# Patient Record
Sex: MALE
[De-identification: names, ages, dates, MRNs, and addresses within clinical notes are randomized per-mention and may not be internally consistent; named-entity substitution may affect disease eponyms.]

---

## 2020-08-26 ENCOUNTER — DASHBOARD ENCOUNTERS (OUTPATIENT)
Age: 70
End: 2020-08-26

## 2020-08-26 ENCOUNTER — OFFICE VISIT (OUTPATIENT)
Dept: URBAN - METROPOLITAN AREA CLINIC 19 | Facility: CLINIC | Age: 70
End: 2020-08-26
Payer: MEDICARE

## 2020-08-26 DIAGNOSIS — R53.83 OTHER FATIGUE: ICD-10-CM

## 2020-08-26 DIAGNOSIS — R10.32 ABDOMINAL PAIN, LLQ: ICD-10-CM

## 2020-08-26 DIAGNOSIS — R10.31 ABDOMINAL PAIN, RLQ: ICD-10-CM

## 2020-08-26 PROCEDURE — 99204 OFFICE O/P NEW MOD 45 MIN: CPT | Performed by: INTERNAL MEDICINE

## 2020-08-26 PROCEDURE — 3017F COLORECTAL CA SCREEN DOC REV: CPT | Performed by: INTERNAL MEDICINE

## 2020-08-26 PROCEDURE — G8427 DOCREV CUR MEDS BY ELIG CLIN: HCPCS | Performed by: INTERNAL MEDICINE

## 2020-08-26 PROCEDURE — 1036F TOBACCO NON-USER: CPT | Performed by: INTERNAL MEDICINE

## 2020-08-26 NOTE — HPI-TODAY'S VISIT:
Mr. Zhu is a 70 year old male who presents to GI clinic for abdominal pain.    He reports pain started on 8/23/2020. He reports the pain started on LLQ and migrated to RLQ. He reports having sensation of being bloated. He reports occassionally having occassional pinprick sensation.   He reports pain has since resolved.   He reports feeling fatigued for approximately 3-4 days.   He reports he has been self-isolating for approximately 6 months with his wife due to coronovirus concerns.   He reports his last colonoscopy was approximately 7-8 years ago; he reports he has never had colon polyps.   He reports having darker stools than normal but reports it is not black.

## 2020-08-27 LAB
A/G RATIO: 1.5
ALBUMIN: 4
ALKALINE PHOSPHATASE: 104
ALT (SGPT): 35
AST (SGOT): 28
BILIRUBIN, TOTAL: 1.8
BUN/CREATININE RATIO: 16
BUN: 23
CALCIUM: 9.1
CARBON DIOXIDE, TOTAL: 22
CHLORIDE: 95
CREATININE: 1.45
EGFR IF AFRICN AM: 55
EGFR IF NONAFRICN AM: 48
GLOBULIN, TOTAL: 2.6
GLUCOSE: 139
HEMATOCRIT: 46.4
HEMOGLOBIN: 15.1
MCH: 30.5
MCHC: 32.5
MCV: 94
NRBC: (no result)
PLATELETS: 265
POTASSIUM: 3.3
PROTEIN, TOTAL: 6.6
RBC: 4.95
RDW: 12.9
SODIUM: 138
WBC: 20.1

## 2020-08-28 ENCOUNTER — TELEPHONE ENCOUNTER (OUTPATIENT)
Dept: URBAN - METROPOLITAN AREA CLINIC 92 | Facility: CLINIC | Age: 70
End: 2020-08-28

## 2020-09-30 ENCOUNTER — OFFICE VISIT (OUTPATIENT)
Dept: URBAN - METROPOLITAN AREA CLINIC 19 | Facility: CLINIC | Age: 70
End: 2020-09-30